# Patient Record
Sex: MALE | Race: WHITE | NOT HISPANIC OR LATINO | URBAN - METROPOLITAN AREA
[De-identification: names, ages, dates, MRNs, and addresses within clinical notes are randomized per-mention and may not be internally consistent; named-entity substitution may affect disease eponyms.]

---

## 2018-11-15 ENCOUNTER — IMPORTED ENCOUNTER (OUTPATIENT)
Dept: URBAN - METROPOLITAN AREA CLINIC 50 | Facility: CLINIC | Age: 68
End: 2018-11-15

## 2020-04-07 ENCOUNTER — IMPORTED ENCOUNTER (OUTPATIENT)
Dept: URBAN - METROPOLITAN AREA CLINIC 50 | Facility: CLINIC | Age: 70
End: 2020-04-07

## 2021-04-17 ASSESSMENT — VISUAL ACUITY
OD_OTHER: 20/30. 20/30.
OS_BAT: 20/25
OD_CC: J1
OD_CC: 20/30
OS_CC: J1
OS_OTHER: 20/25. 20/30.
OD_BAT: 20/30
OS_CC: 20/25

## 2021-04-17 ASSESSMENT — TONOMETRY
OS_IOP_MMHG: 17
OD_IOP_MMHG: 16

## 2021-05-06 ENCOUNTER — COMPREHENSIVE EXAM (OUTPATIENT)
Dept: URBAN - METROPOLITAN AREA CLINIC 53 | Facility: CLINIC | Age: 71
End: 2021-05-06

## 2021-05-06 DIAGNOSIS — H43.813: ICD-10-CM

## 2021-05-06 DIAGNOSIS — H25.13: ICD-10-CM

## 2021-05-06 DIAGNOSIS — E11.9: ICD-10-CM

## 2021-05-06 PROCEDURE — 92014 COMPRE OPH EXAM EST PT 1/>: CPT

## 2021-05-06 PROCEDURE — 92134 CPTRZ OPH DX IMG PST SGM RTA: CPT

## 2021-05-06 ASSESSMENT — VISUAL ACUITY
OS_SC: 20/70
OD_SC: 20/50
OU_CC: J1
OD_GLARE: >20/400
OD_PH: 20/40

## 2021-05-06 ASSESSMENT — TONOMETRY
OS_IOP_MMHG: 16
OD_IOP_MMHG: 16

## 2021-05-06 NOTE — PATIENT DISCUSSION
Waiting due to patient needing to discuss with his cancer doctors. Will contact us when able to move forward.